# Patient Record
Sex: FEMALE | Race: WHITE | NOT HISPANIC OR LATINO | URBAN - METROPOLITAN AREA
[De-identification: names, ages, dates, MRNs, and addresses within clinical notes are randomized per-mention and may not be internally consistent; named-entity substitution may affect disease eponyms.]

---

## 2023-08-31 PROBLEM — J45.909 REACTIVE AIRWAY DISEASE (HHS-HCC): Status: ACTIVE | Noted: 2023-08-31

## 2023-10-09 ENCOUNTER — ANCILLARY PROCEDURE (OUTPATIENT)
Dept: RADIOLOGY | Facility: CLINIC | Age: 53
End: 2023-10-09
Payer: COMMERCIAL

## 2023-10-09 ENCOUNTER — OFFICE VISIT (OUTPATIENT)
Dept: RHEUMATOLOGY | Facility: CLINIC | Age: 53
End: 2023-10-09
Payer: COMMERCIAL

## 2023-10-09 VITALS
WEIGHT: 190 LBS | BODY MASS INDEX: 29.82 KG/M2 | SYSTOLIC BLOOD PRESSURE: 116 MMHG | HEIGHT: 67 IN | HEART RATE: 96 BPM | DIASTOLIC BLOOD PRESSURE: 66 MMHG

## 2023-10-09 DIAGNOSIS — M79.642 BILATERAL HAND PAIN: Primary | ICD-10-CM

## 2023-10-09 DIAGNOSIS — M79.641 BILATERAL HAND PAIN: ICD-10-CM

## 2023-10-09 DIAGNOSIS — M79.641 BILATERAL HAND PAIN: Primary | ICD-10-CM

## 2023-10-09 DIAGNOSIS — M79.642 BILATERAL HAND PAIN: ICD-10-CM

## 2023-10-09 PROCEDURE — 99204 OFFICE O/P NEW MOD 45 MIN: CPT | Performed by: INTERNAL MEDICINE

## 2023-10-09 PROCEDURE — 73130 X-RAY EXAM OF HAND: CPT | Mod: 50

## 2023-10-09 PROCEDURE — 73130 X-RAY EXAM OF HAND: CPT | Mod: BILATERAL PROCEDURE | Performed by: RADIOLOGY

## 2023-10-09 RX ORDER — ALBUTEROL SULFATE 90 UG/1
2 AEROSOL, METERED RESPIRATORY (INHALATION) EVERY 6 HOURS PRN
COMMUNITY

## 2023-10-09 ASSESSMENT — ENCOUNTER SYMPTOMS
DEPRESSION: 0
FATIGUE: 0
ADENOPATHY: 0
OCCASIONAL FEELINGS OF UNSTEADINESS: 0
DYSURIA: 0
SHORTNESS OF BREATH: 0
LOSS OF SENSATION IN FEET: 0
SLEEP DISTURBANCE: 0
DIZZINESS: 0
HEADACHES: 0
ABDOMINAL PAIN: 0

## 2023-10-09 NOTE — PROGRESS NOTES
Subjective   Patient ID: Ana Almanzar is a 53 y.o. female who presents for New Patient Visit (Presents for initial visit. She is referred by Dr. Diaz for pain in hands, knees, feet, back and heels.).  HPI  Patient referred for evaluation of musculoskeletal symptoms.    Her symptoms have been going on since 2018.  She has had pain in her hands, knees, feet, back, heels.  She has been taking ibuprofen for this.    She recently had COVID in August 2023.    She did have foot surgery to remove a neuroma and fix her arch in 1998.    Feels that her thumbs pop out a lot.  Paper is hard to hold because of pain.  Mostly in the thumbs, started in the left and has started on the right.  She is right handed.  Her knees have bothered her.  She had a knot on her right knee that was bothering her 2017.  She had been evaluated in early 2000 to see if she had RA.    She's not really as active.  She works in a factory lab in .    She does a lot of finger handling.     No previous injuries.      In the am her hands, arms elbows, feet and knees are all stiff.  Usually takes 20-30 minutes.  Does have gelling after sitting in the car for a while      Social history includes past tobacco use.  She does drink socially.  Denies any illicit drug use.  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  Also has worked in the factory.    Family history includes heart disease, rheumatoid arthritis, skin cancer.      Review of Systems   Constitutional:  Negative for fatigue.        Recent weight gain after she quit smoking    HENT:  Negative for mouth sores.    Eyes:  Negative for visual disturbance.   Respiratory:  Negative for shortness of breath.    Cardiovascular:  Negative for chest pain.   Gastrointestinal:  Negative for abdominal pain.   Genitourinary:  Negative for dysuria.   Musculoskeletal:         Per HPI   Skin:  Negative for rash.   Neurological:  Negative for dizziness and headaches.    Hematological:  Negative for adenopathy.   Psychiatric/Behavioral:  Negative for sleep disturbance.        Objective   Physical Exam  GEN: NAD A&O x3 appropriate affect  HENT:no enlarged glands or thyroid  EYES:  no conjunctival redness, eyelids normal  LYMPH: no cervical  lymphadenopathy  NEURO: 5/5 strength upper and lower extremities, DTR +2 bicep and patellar tendons  SKIN: no rashes or psoriasis seen on exposed skin  PULSES: +radials  MSK:  Hypertrophic changes seen in the DIP and CMC's  No swelling in the elbows  Anterior rotation shoulders with prominence of the acromioclavicular joint  Decreased range of motion of cervical spine  Kyphosis thoracic spine  Hypertrophic changes bilateral knees    Assessment/Plan        Patient with joint pain.  Exam is consistent with osteoarthritis especially in the bilateral CMC's, left greater than right.  Has difficulty with finger dexterity due to this.  Also has other joint symptoms that may be consistent with osteoarthritis.    We will get further blood work concerning her symptoms and also x-rays of her bilateral hands.    We will discuss her results once they have returned.